# Patient Record
Sex: FEMALE | Race: WHITE
[De-identification: names, ages, dates, MRNs, and addresses within clinical notes are randomized per-mention and may not be internally consistent; named-entity substitution may affect disease eponyms.]

---

## 2020-02-17 ENCOUNTER — HOSPITAL ENCOUNTER (OUTPATIENT)
Dept: HOSPITAL 92 - SCSULT | Age: 30
Discharge: HOME | End: 2020-02-17
Attending: FAMILY MEDICINE
Payer: COMMERCIAL

## 2020-02-17 DIAGNOSIS — R79.89: Primary | ICD-10-CM

## 2020-02-17 DIAGNOSIS — K82.4: ICD-10-CM

## 2020-02-17 PROCEDURE — 76705 ECHO EXAM OF ABDOMEN: CPT

## 2020-02-17 NOTE — ULT
Sonogram right upper quadrant



HISTORY: Right upper quadrant pain. Increasing abnormal liver function tests.



FINDINGS: Gallbladder is well distended without shadowing stone evident. Adherent to the posterior ga
llbladder wall is a 0.5 cm nonshadowing echogenic focus.



Common duct is 0.3 cm. Liver unremarkable without focal mass or intrahepatic biliary dilatation. No f
ree fluid.











IMPRESSION: No evidence of gallstones or biliary obstruction.



Gallbladder wall polyps. No acute abnormalities are demonstrated.



Reported By: LUIS Juárez 

Electronically Signed:  2/17/2020 9:00 AM